# Patient Record
Sex: MALE | Race: WHITE | NOT HISPANIC OR LATINO | Employment: OTHER | URBAN - METROPOLITAN AREA
[De-identification: names, ages, dates, MRNs, and addresses within clinical notes are randomized per-mention and may not be internally consistent; named-entity substitution may affect disease eponyms.]

---

## 2019-05-05 PROBLEM — J44.9 COPD (CHRONIC OBSTRUCTIVE PULMONARY DISEASE): Status: ACTIVE | Noted: 2019-05-05

## 2019-05-05 PROBLEM — R91.1 SOLITARY PULMONARY NODULE: Status: ACTIVE | Noted: 2019-05-05

## 2019-05-05 PROBLEM — Z87.891 HISTORY OF TOBACCO ABUSE: Status: ACTIVE | Noted: 2019-05-05

## 2019-05-18 PROBLEM — F32.A DEPRESSION: Status: ACTIVE | Noted: 2019-05-18

## 2019-08-05 PROBLEM — R91.8 PULMONARY NODULES: Status: ACTIVE | Noted: 2019-05-05

## 2019-08-05 PROBLEM — F20.9 SCHIZOPHRENIA: Status: ACTIVE | Noted: 2019-08-05

## 2019-08-05 PROBLEM — R19.7 DIARRHEA: Status: ACTIVE | Noted: 2018-05-21

## 2020-01-15 PROBLEM — R29.818 NEUROCOGNITIVE DEFICITS: Status: ACTIVE | Noted: 2020-01-15

## 2020-01-15 PROBLEM — R41.89 NEUROCOGNITIVE DEFICITS: Status: ACTIVE | Noted: 2020-01-15

## 2020-08-14 PROBLEM — F25.1 SCHIZOAFFECTIVE DISORDER, DEPRESSIVE TYPE: Status: ACTIVE | Noted: 2019-05-18

## 2020-08-14 PROBLEM — F20.9 SCHIZOPHRENIA: Status: RESOLVED | Noted: 2019-08-05 | Resolved: 2020-08-14

## 2020-11-03 PROBLEM — Z23 NEEDS FLU SHOT: Status: ACTIVE | Noted: 2020-11-03

## 2020-11-03 PROBLEM — K90.41 GLUTEN INTOLERANCE: Status: ACTIVE | Noted: 2020-11-03

## 2020-11-03 PROBLEM — R19.7 DIARRHEA: Status: RESOLVED | Noted: 2018-05-21 | Resolved: 2020-11-03

## 2020-11-03 PROBLEM — Z87.891 HISTORY OF TOBACCO ABUSE: Status: RESOLVED | Noted: 2019-05-05 | Resolved: 2020-11-03

## 2021-01-11 PROBLEM — K21.9 GASTROESOPHAGEAL REFLUX DISEASE: Status: ACTIVE | Noted: 2021-01-11

## 2021-01-11 PROBLEM — I95.1 ORTHOSTATIC HYPOTENSION: Status: ACTIVE | Noted: 2021-01-11

## 2021-01-11 PROBLEM — Z23 NEEDS FLU SHOT: Status: RESOLVED | Noted: 2020-11-03 | Resolved: 2021-01-11

## 2021-05-12 ENCOUNTER — PATIENT MESSAGE (OUTPATIENT)
Dept: RESEARCH | Facility: HOSPITAL | Age: 66
End: 2021-05-12

## 2021-09-13 PROBLEM — J44.9 COPD (CHRONIC OBSTRUCTIVE PULMONARY DISEASE): Chronic | Status: ACTIVE | Noted: 2019-05-05

## 2021-09-16 PROBLEM — G25.71 AKATHISIA: Status: ACTIVE | Noted: 2021-09-16

## 2021-10-19 PROBLEM — F19.91 HISTORY OF ILLICIT DRUG USE: Status: ACTIVE | Noted: 2021-10-19

## 2021-11-30 PROBLEM — R73.03 PREDIABETES: Status: ACTIVE | Noted: 2021-11-30

## 2021-11-30 PROBLEM — I95.1 ORTHOSTATIC HYPOTENSION: Chronic | Status: ACTIVE | Noted: 2021-01-11

## 2021-11-30 PROBLEM — F25.1 SCHIZOAFFECTIVE DISORDER, DEPRESSIVE TYPE: Chronic | Status: ACTIVE | Noted: 2019-05-18

## 2021-11-30 PROBLEM — G25.71 AKATHISIA: Chronic | Status: ACTIVE | Noted: 2021-09-16

## 2021-11-30 PROBLEM — F19.91 HISTORY OF ILLICIT DRUG USE: Chronic | Status: ACTIVE | Noted: 2021-10-19

## 2021-11-30 PROBLEM — R41.89 NEUROCOGNITIVE DEFICITS: Chronic | Status: ACTIVE | Noted: 2020-01-15

## 2021-11-30 PROBLEM — R29.818 NEUROCOGNITIVE DEFICITS: Chronic | Status: ACTIVE | Noted: 2020-01-15

## 2021-11-30 PROBLEM — R73.03 PREDIABETES: Chronic | Status: ACTIVE | Noted: 2021-11-30

## 2022-02-07 PROBLEM — R19.4 CHANGE IN BOWEL HABIT: Status: ACTIVE | Noted: 2022-02-07

## 2022-02-07 PROBLEM — R63.4 WEIGHT LOSS: Status: ACTIVE | Noted: 2022-02-07

## 2022-02-07 PROBLEM — K59.00 CONSTIPATION: Status: ACTIVE | Noted: 2022-02-07

## 2022-02-07 PROBLEM — K76.89 LIVER CYST: Status: ACTIVE | Noted: 2022-02-07

## 2022-02-22 PROBLEM — Z87.898 HISTORY OF PROLONGED Q-T INTERVAL ON ECG: Chronic | Status: ACTIVE | Noted: 2022-02-22

## 2022-02-22 PROBLEM — Z87.898 HISTORY OF PROLONGED Q-T INTERVAL ON ECG: Status: ACTIVE | Noted: 2022-02-22

## 2022-02-22 PROBLEM — G47.00 INSOMNIA: Status: ACTIVE | Noted: 2022-02-22

## 2022-02-22 PROBLEM — G47.00 INSOMNIA: Chronic | Status: ACTIVE | Noted: 2022-02-22

## 2022-05-05 ENCOUNTER — PATIENT MESSAGE (OUTPATIENT)
Dept: RESEARCH | Facility: HOSPITAL | Age: 67
End: 2022-05-05

## 2023-04-03 ENCOUNTER — SOCIAL WORK (OUTPATIENT)
Dept: ADMINISTRATIVE | Facility: OTHER | Age: 68
End: 2023-04-03

## 2023-04-03 NOTE — PROGRESS NOTES
Sherine received notice from Patient's insurance the Namzaric 28-10mg is formulary and does not require a PA. Sherine called [facility] ([phone]) and spoke to the pharmacist. She was able to process a claim through insurance. The medicine is scheduled with their pharmacy delivery on tomorrow. Sherine called and spoke to Patient ([phone]). Sherine explained the Namzaric should be ready for pickup later tomorrow. He verbalized appreciation.    Velma West LCSW    [phone]

## 2023-04-03 NOTE — PROGRESS NOTES
Sw received a referral to assist with PA of Namzaric 28-10mg. PA submitted through [employer]. Awaiting authorization.    Velma West LCSW    [phone]

## 2023-04-24 PROBLEM — J43.2 CENTRILOBULAR EMPHYSEMA: Chronic | Status: ACTIVE | Noted: 2019-05-05

## 2023-04-25 PROBLEM — K21.9 GASTROESOPHAGEAL REFLUX DISEASE: Chronic | Status: ACTIVE | Noted: 2021-01-11

## 2024-05-13 PROBLEM — R29.818 NEUROCOGNITIVE DEFICITS: Chronic | Status: RESOLVED | Noted: 2020-01-15 | Resolved: 2024-05-13

## 2024-05-13 PROBLEM — K59.00 CONSTIPATION: Status: RESOLVED | Noted: 2022-02-07 | Resolved: 2024-05-13

## 2024-05-13 PROBLEM — R19.4 CHANGE IN BOWEL HABIT: Status: RESOLVED | Noted: 2022-02-07 | Resolved: 2024-05-13

## 2024-05-13 PROBLEM — R41.89 NEUROCOGNITIVE DEFICITS: Chronic | Status: RESOLVED | Noted: 2020-01-15 | Resolved: 2024-05-13

## 2024-05-13 PROBLEM — R63.4 WEIGHT LOSS: Status: RESOLVED | Noted: 2022-02-07 | Resolved: 2024-05-13

## 2024-05-13 PROBLEM — J43.2 CENTRILOBULAR EMPHYSEMA: Status: ACTIVE | Noted: 2024-05-13

## 2024-11-18 PROBLEM — J45.50 SEVERE PERSISTENT ASTHMA: Status: ACTIVE | Noted: 2024-11-18

## 2025-02-12 PROBLEM — Z86.0100 HX OF COLONIC POLYPS: Status: ACTIVE | Noted: 2025-02-12

## 2025-02-12 PROBLEM — K57.30 PANCOLONIC DIVERTICULOSIS: Status: ACTIVE | Noted: 2025-02-12

## 2025-02-18 ENCOUNTER — PATIENT MESSAGE (OUTPATIENT)
Dept: ADMINISTRATIVE | Facility: OTHER | Age: 70
End: 2025-02-18

## 2025-07-14 ENCOUNTER — PATIENT MESSAGE (OUTPATIENT)
Dept: ADMINISTRATIVE | Facility: OTHER | Age: 70
End: 2025-07-14